# Patient Record
Sex: MALE | Race: WHITE | Employment: FULL TIME
[De-identification: names, ages, dates, MRNs, and addresses within clinical notes are randomized per-mention and may not be internally consistent; named-entity substitution may affect disease eponyms.]

---

## 2023-05-02 ENCOUNTER — OFFICE VISIT (OUTPATIENT)
Dept: FAMILY MEDICINE CLINIC | Facility: CLINIC | Age: 33
End: 2023-05-02

## 2023-05-02 VITALS
SYSTOLIC BLOOD PRESSURE: 124 MMHG | WEIGHT: 189 LBS | HEIGHT: 71 IN | DIASTOLIC BLOOD PRESSURE: 80 MMHG | BODY MASS INDEX: 26.46 KG/M2

## 2023-05-02 DIAGNOSIS — Z00.00 ROUTINE GENERAL MEDICAL EXAMINATION AT A HEALTH CARE FACILITY: Primary | ICD-10-CM

## 2023-05-02 DIAGNOSIS — N41.0 ACUTE PROSTATITIS: ICD-10-CM

## 2023-05-02 DIAGNOSIS — R32 URINARY INCONTINENCE, UNSPECIFIED TYPE: ICD-10-CM

## 2023-05-02 LAB
BILIRUBIN, URINE, POC: NEGATIVE
BLOOD URINE, POC: NEGATIVE
GLUCOSE URINE, POC: NEGATIVE
KETONES, URINE, POC: NEGATIVE
LEUKOCYTE ESTERASE, URINE, POC: NEGATIVE
NITRITE, URINE, POC: NEGATIVE
PH, URINE, POC: 6 (ref 4.6–8)
PROTEIN,URINE, POC: NEGATIVE
SPECIFIC GRAVITY, URINE, POC: 1.01 (ref 1–1.03)
URINALYSIS CLARITY, POC: CLEAR
URINALYSIS COLOR, POC: YELLOW
UROBILINOGEN, POC: NORMAL

## 2023-05-02 RX ORDER — CIPROFLOXACIN 500 MG/1
500 TABLET, FILM COATED ORAL 2 TIMES DAILY
Qty: 60 TABLET | Refills: 0 | Status: SHIPPED | OUTPATIENT
Start: 2023-05-02 | End: 2023-06-01

## 2023-05-02 SDOH — ECONOMIC STABILITY: FOOD INSECURITY: WITHIN THE PAST 12 MONTHS, YOU WORRIED THAT YOUR FOOD WOULD RUN OUT BEFORE YOU GOT MONEY TO BUY MORE.: NEVER TRUE

## 2023-05-02 SDOH — ECONOMIC STABILITY: INCOME INSECURITY: HOW HARD IS IT FOR YOU TO PAY FOR THE VERY BASICS LIKE FOOD, HOUSING, MEDICAL CARE, AND HEATING?: NOT VERY HARD

## 2023-05-02 SDOH — ECONOMIC STABILITY: HOUSING INSECURITY
IN THE LAST 12 MONTHS, WAS THERE A TIME WHEN YOU DID NOT HAVE A STEADY PLACE TO SLEEP OR SLEPT IN A SHELTER (INCLUDING NOW)?: NO

## 2023-05-02 SDOH — ECONOMIC STABILITY: FOOD INSECURITY: WITHIN THE PAST 12 MONTHS, THE FOOD YOU BOUGHT JUST DIDN'T LAST AND YOU DIDN'T HAVE MONEY TO GET MORE.: NEVER TRUE

## 2023-05-02 ASSESSMENT — ENCOUNTER SYMPTOMS
COUGH: 0
ANAL BLEEDING: 0
RHINORRHEA: 0
EYE PAIN: 0
SINUS PAIN: 0
ABDOMINAL DISTENTION: 0
EYE REDNESS: 0
EYE DISCHARGE: 0
FACIAL SWELLING: 0
SINUS PRESSURE: 0
ABDOMINAL PAIN: 0
EYE ITCHING: 0
BACK PAIN: 0
CHEST TIGHTNESS: 0
BLOOD IN STOOL: 0
APNEA: 0

## 2023-05-02 ASSESSMENT — PATIENT HEALTH QUESTIONNAIRE - PHQ9
SUM OF ALL RESPONSES TO PHQ QUESTIONS 1-9: 0
SUM OF ALL RESPONSES TO PHQ QUESTIONS 1-9: 0
2. FEELING DOWN, DEPRESSED OR HOPELESS: 0
1. LITTLE INTEREST OR PLEASURE IN DOING THINGS: 0
SUM OF ALL RESPONSES TO PHQ QUESTIONS 1-9: 0
SUM OF ALL RESPONSES TO PHQ QUESTIONS 1-9: 0
SUM OF ALL RESPONSES TO PHQ9 QUESTIONS 1 & 2: 0

## 2023-05-02 NOTE — PROGRESS NOTES
[address] [place] [address]  _______________________________________  Alem White MD                 [address] [place] [address],  [address]. [place], [address] [place] [address]                     HortenciaWei                                                                                     Phone: [phone]                                                                                    Fax: [phone]    Chalo Barnett is a 34 y.o. male who is seen for evaluation of   Chief Complaint   Patient presents with    New Patient     Here to re-establish care    Urinary Frequency    Incontinence     Urinary leakge    Other     Wanting referral to Urology for vasectomy       HPI:   Urinary Frequency   Episode onset: urgency and drip and frequency last 2 months or so, has had pain at time in lower pelvis area, wife is pregnant, due in August. Associated symptoms include frequency. Pertinent negatives include no chills, flank pain or hematuria. Incontinence  Episode onset: drip at times before and after voiding. Other  Episode onset: here for CPX and establish care, not seen in approx 15 years. Pertinent negatives include no abdominal pain, arthralgias, chest pain, chills, congestion, coughing, diaphoresis, fatigue, fever, headaches, joint swelling, myalgias or rash. Chief Complaint   Patient presents with    New Patient     Here to re-establish care    Urinary Frequency    Incontinence     Urinary leakge    Other     Wanting referral to Urology for vasectomy         Review of Systems:    Review of Systems   Constitutional:  Negative for activity change, appetite change, chills, diaphoresis, fatigue and fever. HENT:  Negative for congestion, ear discharge, ear pain, facial swelling, hearing loss, mouth sores, rhinorrhea, sinus pressure and sinus pain. Eyes:  Negative for pain, discharge, redness and itching. Respiratory:  Negative for apnea, cough and chest tightness.     Cardiovascular:  Negative for

## 2023-05-03 DIAGNOSIS — Z00.00 ROUTINE GENERAL MEDICAL EXAMINATION AT A HEALTH CARE FACILITY: ICD-10-CM

## 2023-05-03 LAB
ALBUMIN SERPL-MCNC: 4.3 G/DL (ref 3.5–5)
ALBUMIN/GLOB SERPL: 1.8 (ref 0.4–1.6)
ALP SERPL-CCNC: 89 U/L (ref 50–136)
ALT SERPL-CCNC: 34 U/L (ref 12–65)
ANION GAP SERPL CALC-SCNC: 4 MMOL/L (ref 2–11)
AST SERPL-CCNC: 27 U/L (ref 15–37)
BASOPHILS # BLD: 0 K/UL (ref 0–0.2)
BASOPHILS NFR BLD: 1 % (ref 0–2)
BILIRUB SERPL-MCNC: 0.6 MG/DL (ref 0.2–1.1)
BUN SERPL-MCNC: 16 MG/DL (ref 6–23)
CALCIUM SERPL-MCNC: 9.3 MG/DL (ref 8.3–10.4)
CHLORIDE SERPL-SCNC: 106 MMOL/L (ref 101–110)
CHOLEST SERPL-MCNC: 135 MG/DL
CO2 SERPL-SCNC: 29 MMOL/L (ref 21–32)
CREAT SERPL-MCNC: 1 MG/DL (ref 0.8–1.5)
DIFFERENTIAL METHOD BLD: NORMAL
EOSINOPHIL # BLD: 0.1 K/UL (ref 0–0.8)
EOSINOPHIL NFR BLD: 2 % (ref 0.5–7.8)
ERYTHROCYTE [DISTWIDTH] IN BLOOD BY AUTOMATED COUNT: 13 % (ref 11.9–14.6)
GLOBULIN SER CALC-MCNC: 2.4 G/DL (ref 2.8–4.5)
GLUCOSE SERPL-MCNC: 90 MG/DL (ref 65–100)
HCT VFR BLD AUTO: 46.6 % (ref 41.1–50.3)
HDLC SERPL-MCNC: 54 MG/DL (ref 40–60)
HDLC SERPL: 2.5
HGB BLD-MCNC: 16.1 G/DL (ref 13.6–17.2)
IMM GRANULOCYTES # BLD AUTO: 0 K/UL (ref 0–0.5)
IMM GRANULOCYTES NFR BLD AUTO: 0 % (ref 0–5)
LDLC SERPL CALC-MCNC: 69 MG/DL
LYMPHOCYTES # BLD: 2.5 K/UL (ref 0.5–4.6)
LYMPHOCYTES NFR BLD: 38 % (ref 13–44)
MCH RBC QN AUTO: 31.9 PG (ref 26.1–32.9)
MCHC RBC AUTO-ENTMCNC: 34.5 G/DL (ref 31.4–35)
MCV RBC AUTO: 92.3 FL (ref 82–102)
MONOCYTES # BLD: 0.6 K/UL (ref 0.1–1.3)
MONOCYTES NFR BLD: 9 % (ref 4–12)
NEUTS SEG # BLD: 3.3 K/UL (ref 1.7–8.2)
NEUTS SEG NFR BLD: 50 % (ref 43–78)
NRBC # BLD: 0 K/UL (ref 0–0.2)
PLATELET # BLD AUTO: 253 K/UL (ref 150–450)
PMV BLD AUTO: 10.2 FL (ref 9.4–12.3)
POTASSIUM SERPL-SCNC: 3.8 MMOL/L (ref 3.5–5.1)
PROT SERPL-MCNC: 6.7 G/DL (ref 6.3–8.2)
RBC # BLD AUTO: 5.05 M/UL (ref 4.23–5.6)
SODIUM SERPL-SCNC: 139 MMOL/L (ref 133–143)
TRIGL SERPL-MCNC: 60 MG/DL (ref 35–150)
VLDLC SERPL CALC-MCNC: 12 MG/DL (ref 6–23)
WBC # BLD AUTO: 6.6 K/UL (ref 4.3–11.1)

## 2023-08-23 ENCOUNTER — OFFICE VISIT (OUTPATIENT)
Dept: UROLOGY | Age: 33
End: 2023-08-23
Payer: COMMERCIAL

## 2023-08-23 DIAGNOSIS — R35.0 URINARY FREQUENCY: ICD-10-CM

## 2023-08-23 DIAGNOSIS — R32 URINARY INCONTINENCE, UNSPECIFIED TYPE: Primary | ICD-10-CM

## 2023-08-23 DIAGNOSIS — R10.32 LEFT GROIN PAIN: ICD-10-CM

## 2023-08-23 LAB
BILIRUBIN, URINE, POC: NEGATIVE
BLOOD URINE, POC: NEGATIVE
GLUCOSE URINE, POC: NEGATIVE
KETONES, URINE, POC: NEGATIVE
LEUKOCYTE ESTERASE, URINE, POC: NEGATIVE
NITRITE, URINE, POC: NEGATIVE
PH, URINE, POC: 7 (ref 4.6–8)
PROTEIN,URINE, POC: NEGATIVE
SPECIFIC GRAVITY, URINE, POC: 1.01 (ref 1–1.03)
URINALYSIS CLARITY, POC: NORMAL
URINALYSIS COLOR, POC: NORMAL
UROBILINOGEN, POC: NORMAL

## 2023-08-23 PROCEDURE — 99204 OFFICE O/P NEW MOD 45 MIN: CPT | Performed by: NURSE PRACTITIONER

## 2023-08-23 PROCEDURE — 81003 URINALYSIS AUTO W/O SCOPE: CPT | Performed by: NURSE PRACTITIONER

## 2023-08-23 ASSESSMENT — ENCOUNTER SYMPTOMS
BACK PAIN: 0
COUGH: 0
VOMITING: 0
ABDOMINAL PAIN: 0
INDIGESTION: 0
DIARRHEA: 0
WHEEZING: 0
NAUSEA: 0
SHORTNESS OF BREATH: 0
SKIN LESIONS: 0
EYE PAIN: 0
CONSTIPATION: 0
BLOOD IN STOOL: 0
HEARTBURN: 0
EYE DISCHARGE: 0

## 2023-08-23 NOTE — PROGRESS NOTES
[address]  [phone]          Jackelyn Salgado  : 1990    Chief Complaint   Patient presents with    New Patient    Incontinence          HPI     Jackelyn Salgado is a 34 y.o. male    Here today with complaints of urinary urgency and urinary dribbling. Patient says the symptoms have been going on since the first of the year. He has some \"discomfort in the lower abdomen pelvis area as well. \". He will get up at least 2 times a night to urinate, however, he does drink up until bedtime. He has a good bit of urinary dribbling at the end of the stream.  He has occasional dribbling before making it to the bathroom. He denies any significant side or back pain. He was given a round of antibiotics to take for 30 days for probable prostatitis. He says the antibiotic however did not help the symptoms. He tells me there is no family history of bladder, kidney or prostate issues. He has not been 1 that is ever had recurrent urinary infections. No fever chills or gross hematuria has been reported. History reviewed. No pertinent past medical history. Past Surgical History:   Procedure Laterality Date    ORTHOPEDIC SURGERY      labrum repair and ban herb surgery     No current outpatient medications on file. No current facility-administered medications for this visit.      No Known Allergies  Social History     Socioeconomic History    Marital status:      Spouse name: Not on file    Number of children: Not on file    Years of education: Not on file    Highest education level: Not on file   Occupational History    Not on file   Tobacco Use    Smoking status: Never    Smokeless tobacco: Current     Types: Chew   Vaping Use    Vaping Use: Never used   Substance and Sexual Activity    Alcohol use: Yes     Comment: Social maybe 3-4 drinks per year    Drug use: Never    Sexual activity: Yes     Partners: Female   Other Topics

## 2023-09-07 ENCOUNTER — HOSPITAL ENCOUNTER (OUTPATIENT)
Dept: CT IMAGING | Age: 33
Discharge: HOME | End: 2023-09-09
Payer: COMMERCIAL

## 2023-09-07 DIAGNOSIS — R35.0 URINARY FREQUENCY: ICD-10-CM

## 2023-09-07 DIAGNOSIS — R32 URINARY INCONTINENCE, UNSPECIFIED TYPE: ICD-10-CM

## 2023-09-07 DIAGNOSIS — R10.32 LEFT GROIN PAIN: ICD-10-CM

## 2023-09-07 PROCEDURE — 74176 CT ABD & PELVIS W/O CONTRAST: CPT

## 2023-09-27 ENCOUNTER — TELEPHONE (OUTPATIENT)
Dept: UROLOGY | Age: 33
End: 2023-09-27

## 2023-09-27 NOTE — TELEPHONE ENCOUNTER
Spoke with patient this morning who said the visit today was for a vas consult. Pt was originally scheduled for Vas consult on 8/29 then canceled on 8/22 since we are not allowed to perform those any longer. Not sure why this appt was r/s as a 3 week f/u. Pt saw Kirsten Rodriguez last, went for CT and everything was normal. Will cancel today's appt.

## 2023-11-02 ENCOUNTER — OFFICE VISIT (OUTPATIENT)
Dept: FAMILY MEDICINE CLINIC | Facility: CLINIC | Age: 33
End: 2023-11-02
Payer: COMMERCIAL

## 2023-11-02 VITALS
SYSTOLIC BLOOD PRESSURE: 128 MMHG | HEIGHT: 71 IN | DIASTOLIC BLOOD PRESSURE: 70 MMHG | WEIGHT: 191 LBS | BODY MASS INDEX: 26.74 KG/M2

## 2023-11-02 DIAGNOSIS — N50.812 PAIN IN LEFT TESTICLE: Primary | ICD-10-CM

## 2023-11-02 PROCEDURE — 99213 OFFICE O/P EST LOW 20 MIN: CPT | Performed by: FAMILY MEDICINE

## 2023-11-02 ASSESSMENT — ENCOUNTER SYMPTOMS
EYE REDNESS: 0
ANAL BLEEDING: 0
BLOOD IN STOOL: 0
EYE ITCHING: 0
BACK PAIN: 0
CHEST TIGHTNESS: 0
APNEA: 0
FACIAL SWELLING: 0
COUGH: 0
SINUS PAIN: 0
SINUS PRESSURE: 0
RHINORRHEA: 0
EYE DISCHARGE: 0
EYE PAIN: 0
ABDOMINAL PAIN: 0
ABDOMINAL DISTENTION: 0

## 2023-11-02 ASSESSMENT — PATIENT HEALTH QUESTIONNAIRE - PHQ9
SUM OF ALL RESPONSES TO PHQ QUESTIONS 1-9: 0
2. FEELING DOWN, DEPRESSED OR HOPELESS: 0
SUM OF ALL RESPONSES TO PHQ QUESTIONS 1-9: 0
1. LITTLE INTEREST OR PLEASURE IN DOING THINGS: 0
SUM OF ALL RESPONSES TO PHQ9 QUESTIONS 1 & 2: 0

## 2023-11-13 ENCOUNTER — TELEPHONE (OUTPATIENT)
Dept: UROLOGY | Age: 33
End: 2023-11-13

## 2023-11-13 NOTE — TELEPHONE ENCOUNTER
----- Message from Zeynep Bazan MD sent at 11/2/2023  5:08 PM EDT -----  See Mckenna Evans or me in [facility]. No ledezma.        ----- Message -----  From: Martha Londono MD  Sent: 11/2/2023   4:00 PM EDT  To: MD Dr Nima Dietrich  This patient was scheduled to see you recently for vasectomy consult but since you guys are on pause for vas, his appt was cancelled. He is ALSO having chronic left testicle pain and groin pain. Has a small swelling at spermatic cord at upper left side. Please set up an appt for eval the pain and he probably still wants to talk to you about vas when your arrangements are finished.   (referral should still be good since he saw Speedy Carbajal in August)    Thanks    Indy Maguire MD

## 2024-01-17 ASSESSMENT — ENCOUNTER SYMPTOMS: BACK PAIN: 0

## 2024-01-17 NOTE — PROGRESS NOTES
[facility]  [address] [address]  [phone]          Charles Andujar  : 1990    Chief Complaint   Patient presents with    Follow-up          HPI     Charles Andujar is a 33 y.o. male seen in  by our NP in regards to urgency and post void dribbling.  He had been diagnosed with prostatitis and took an antibiotic with resolution.  In Nov and Dec '23, he had occasional \"MILD discomfort\" of L scrotum. This has resolved as well.  He has no complaint today.     History reviewed. No pertinent past medical history.  Past Surgical History:   Procedure Laterality Date    ORTHOPEDIC SURGERY      labrum repair and ban herb surgery     No current outpatient medications on file.     No current facility-administered medications for this visit.     No Known Allergies  Social History     Socioeconomic History    Marital status:      Spouse name: Not on file    Number of children: Not on file    Years of education: Not on file    Highest education level: Not on file   Occupational History    Not on file   Tobacco Use    Smoking status: Never    Smokeless tobacco: Current     Types: Chew   Vaping Use    Vaping Use: Never used   Substance and Sexual Activity    Alcohol use: Yes     Comment: Social maybe 3-4 drinks per year    Drug use: Never    Sexual activity: Yes     Partners: Female   Other Topics Concern    Not on file   Social History Narrative    Not on file     Social Determinants of Health     Financial Resource Strain: Low Risk  (2023)    Overall Financial Resource Strain (CARDIA)     Difficulty of Paying Living Expenses: Not very hard   Food Insecurity: Not on file (2023)   Transportation Needs: Unknown (2023)    PRAPARE - Transportation     Lack of Transportation (Medical): Not on file     Lack of Transportation (Non-Medical): No   Physical Activity: Not on file   Stress: Not on file   Social Connections: Not on file   Intimate

## 2024-01-18 ENCOUNTER — OFFICE VISIT (OUTPATIENT)
Dept: UROLOGY | Age: 34
End: 2024-01-18
Payer: COMMERCIAL

## 2024-01-18 DIAGNOSIS — N50.812 PAIN IN LEFT TESTICLE: Primary | ICD-10-CM

## 2024-01-18 LAB
BILIRUBIN, URINE, POC: NEGATIVE
BLOOD URINE, POC: NEGATIVE
GLUCOSE URINE, POC: NEGATIVE
KETONES, URINE, POC: NEGATIVE
LEUKOCYTE ESTERASE, URINE, POC: NEGATIVE
NITRITE, URINE, POC: NEGATIVE
PH, URINE, POC: 6.5 (ref 4.6–8)
PROTEIN,URINE, POC: NEGATIVE
SPECIFIC GRAVITY, URINE, POC: 1.02 (ref 1–1.03)
URINALYSIS CLARITY, POC: NORMAL
URINALYSIS COLOR, POC: NORMAL
UROBILINOGEN, POC: NORMAL

## 2024-01-18 PROCEDURE — 99213 OFFICE O/P EST LOW 20 MIN: CPT | Performed by: UROLOGY

## 2024-01-18 PROCEDURE — 81003 URINALYSIS AUTO W/O SCOPE: CPT | Performed by: UROLOGY
